# Patient Record
Sex: MALE | Race: WHITE | URBAN - METROPOLITAN AREA
[De-identification: names, ages, dates, MRNs, and addresses within clinical notes are randomized per-mention and may not be internally consistent; named-entity substitution may affect disease eponyms.]

---

## 2021-09-14 NOTE — PROGRESS NOTES
"MEDICAL WEIGHT LOSS INITIAL EVALUATION  DIAGNOSIS:  Class III Obesity    NUTRITION HISTORY:    Do you typically eat breakfast? Yes   If you do eat breakfast, what types of food do you eat? 4 strips claros and 2 eggs, 2 slices toast within 1 hour of waking   Do you typically eat lunch? Yes   If you do eat lunch, what types of food do you typically eat?  2 slices toast with butter, peanut butter, jam or sugar or ham or grilled cheese sandwich   Do you typically eat supper? Yes   If you do eat supper, what types of food do you typically eat? Dominos pizza or 2 brats on buns or claros, eggs, toast   Do you typically eat snacks? Yes-2X per day   If you do snack, what types of food do you typically eat? cheese, crackers, popcorn, 2 slices English muffin bread toasted with peanut butter/ jam or sugar, buttered popcorn       Beverage choices: 6-8 cans Diet Coke or Diet pepsi, rarely drinks water,coffee only when dining out, ETOH- 2 drinks every 2 months  Dining out: Fast food a few X per week-claros cheese burger, french fries, diet Coke  Binge eating: no  Emotional eating: yes, due to feeling bored, or happy/ reward  Night time eating: no  Exercise: no, (knee and back pain)  Other: Spouse does most of the shopping for groceries and most nights  they eat separately (dinner together 2-3X per week). Wife often eats Weight Watchers frozen meal. Adult daughter lives with the family. Patient says his wife arranged this weight loss visit. Retired ~1.5 years ago and has not worked with diabetes education for T2 DM, but has been advised to get a referral from him primary care MD.    ANTHROPOMETRICS:  Height: 5'10\"  Weight: 366.3 lb  BMI:  52.56 kg/m2  NUTRITION DIAGNOSIS:   Obese, class III related to excess energy intake as evidence by BMI of  52.56  NUTRITION INTERVENTIONS  Nutrition Prescription:  Recommend modified energy- nutrient intake  Implementation:  Nutrition Education (Content):    Discussed portion sizes     Determined " alternative to emotional eating    Reviewed My Plate Guidelines (My Plate Planner)    Provided:  My Plate Guidelines, guidelines for selecting protein drinks/ low calorie frozen meals    Nutrition Education (Application):     Patient to practice goals as stated below    Patient verbalizes fair-good understanding of diet    Anticipate fair-good compliance    Goals:  Focus on alternatives to bored/ reward eating (exercise, hobbies, broadcasts, take a class)  Replace snacks with 1 protein drink/day  Try having a low calore frozen entree for dinner a few times per week      FOLLOW UP AND MONITORING:    Other  - follow up with Diabetes Education     TIME SPENT WITH PATIENT:   28 minutes   Mg Fisher RD, LD  [facility]

## 2021-09-15 ENCOUNTER — OFFICE VISIT (OUTPATIENT)
Dept: SURGERY | Facility: CLINIC | Age: 66
End: 2021-09-15
Payer: MEDICARE

## 2021-09-15 ENCOUNTER — ALLIED HEALTH/NURSE VISIT (OUTPATIENT)
Dept: SURGERY | Facility: CLINIC | Age: 66
End: 2021-09-15
Payer: MEDICARE

## 2021-09-15 VITALS
HEART RATE: 83 BPM | BODY MASS INDEX: 45.1 KG/M2 | WEIGHT: 315 LBS | OXYGEN SATURATION: 96 % | SYSTOLIC BLOOD PRESSURE: 132 MMHG | HEIGHT: 70 IN | DIASTOLIC BLOOD PRESSURE: 86 MMHG

## 2021-09-15 DIAGNOSIS — E66.01 MORBID OBESITY WITH BMI OF 50.0-59.9, ADULT (H): ICD-10-CM

## 2021-09-15 DIAGNOSIS — E11.9 TYPE 2 DIABETES MELLITUS WITH INSULIN THERAPY (H): ICD-10-CM

## 2021-09-15 DIAGNOSIS — Z79.4 TYPE 2 DIABETES MELLITUS WITH INSULIN THERAPY (H): ICD-10-CM

## 2021-09-15 DIAGNOSIS — I10 HYPERTENSION, UNSPECIFIED TYPE: ICD-10-CM

## 2021-09-15 DIAGNOSIS — E78.00 HIGH CHOLESTEROL: ICD-10-CM

## 2021-09-15 DIAGNOSIS — G47.33 OSA (OBSTRUCTIVE SLEEP APNEA): ICD-10-CM

## 2021-09-15 DIAGNOSIS — E66.01 MORBID OBESITY WITH BMI OF 50.0-59.9, ADULT (H): Primary | ICD-10-CM

## 2021-09-15 PROCEDURE — 99215 OFFICE O/P EST HI 40 MIN: CPT | Performed by: PHYSICIAN ASSISTANT

## 2021-09-15 PROCEDURE — 97802 MEDICAL NUTRITION INDIV IN: CPT | Performed by: DIETITIAN, REGISTERED

## 2021-09-15 RX ORDER — LEVOTHYROXINE SODIUM 75 UG/1
TABLET ORAL
COMMUNITY
Start: 2020-01-01

## 2021-09-15 RX ORDER — LOSARTAN POTASSIUM 100 MG/1
100 TABLET ORAL
COMMUNITY
Start: 2020-01-01

## 2021-09-15 RX ORDER — INSULIN ASPART 100 [IU]/ML
INJECTION, SOLUTION INTRAVENOUS; SUBCUTANEOUS
COMMUNITY
Start: 2020-01-01

## 2021-09-15 RX ORDER — TORSEMIDE 10 MG/1
10 TABLET ORAL
COMMUNITY
Start: 2020-01-01

## 2021-09-15 RX ORDER — INSULIN DEGLUDEC 200 U/ML
INJECTION, SOLUTION SUBCUTANEOUS
COMMUNITY
Start: 2020-01-01

## 2021-09-15 RX ORDER — HYDROCHLOROTHIAZIDE 25 MG/1
25 TABLET ORAL DAILY
COMMUNITY
Start: 2020-12-19

## 2021-09-15 RX ORDER — FLASH GLUCOSE SENSOR
KIT MISCELLANEOUS
COMMUNITY
Start: 2020-10-27

## 2021-09-15 RX ORDER — FLURBIPROFEN SODIUM 0.3 MG/ML
SOLUTION/ DROPS OPHTHALMIC
COMMUNITY
Start: 2021-07-25

## 2021-09-15 RX ORDER — METOPROLOL TARTRATE 25 MG/1
25 TABLET, FILM COATED ORAL
COMMUNITY
Start: 2021-03-17

## 2021-09-15 RX ORDER — FLASH GLUCOSE SENSOR
1 KIT MISCELLANEOUS
COMMUNITY
Start: 2021-01-05

## 2021-09-15 RX ORDER — SYRINGE-NEEDLE,INSULIN,0.5 ML 28GX1/2"
1 SYRINGE, EMPTY DISPOSABLE MISCELLANEOUS
COMMUNITY
Start: 2021-01-29

## 2021-09-15 RX ORDER — PRAVASTATIN SODIUM 40 MG
TABLET ORAL
COMMUNITY
Start: 2020-01-01

## 2021-09-15 ASSESSMENT — MIFFLIN-ST. JEOR: SCORE: 2452.78

## 2021-09-15 NOTE — PATIENT INSTRUCTIONS
It was great meeting you today!  I look forward to seeing you next month and checking on some of the goals we set!      Goals:  Is to decrease diet soda in half - thus going down to 30 oz daily and substituting the other 30 oz for water.  Diabetic education  Patient will look into getting a recumbent bike           Eat Better ? Move More ? Live Well    Eat 3 nutrient-rich meals each day     Don t skip meals--it will cause you to overeat later in the day!     Eating fiber (vegetables/fruits/whole grains) and protein with meals helps you stay full longer     Choose foods with less than 10 grams of sugar and 5 grams of fat per serving to prevent excess calories and weight re-gain   Eat around the same times each day to develop a routine eating schedule    Avoid snacking unless physically hungry.   Planned snacks: 1-2 times per day and no more than 150 calories    Eat protein first    Protein helps with healing, maintaining adequate muscle mass, reducing hunger and optimizing nutritional status    Aim for 60-80 grams of protein per day   Fill up on Fiber    Fiber comes from plants--fruits, veggies, whole grains, nuts/seeds and beans    Fiber is low in calories, high in phytonutrients and helps you stay full longer    Aim for 25-35 grams per day by eating fiber with meals and snacks  Eat [id number]    Take 20-30 minutes to eat each meal by taking small bites, chewing foods to applesauce consistency or 20-30 times before you swallow    Eating foods too fast can delay satiety/fullness signals and increase overeating   ? Slow down your eating by using toddler utensils, putting your fork/spoon down between bites and not watching TV or emailing during meals!   Keep a Journal          Writing down what you eat, how you feel and when you are active helps you identify new changes to work on from week to week          Look for ways to cut 100 calories from your current diet 2-3 times per day  Drink 64 ounces of 0-Calorie drinks  between meals    Water    Zero calorie Propel  or Vitamin Water      SoBe Lifewater  Zero Calories    Crystal Light , Sugar-Free Aureliano-Aid , and other sugar-free lemonade or flavored allan    Keep Caffeine to less than 300mg per day ie: 3-6oz cups coffee     Work up to 45-60 minutes of physical activity most days of the week    Helps with losing weight and prevent regaining those extra pounds!     Do a combo of cardio (walking/water exercises) and strength training (lifting weights/Vinyasa yoga)    Avoid Mindless Eating    Be present when you eat--take note of the smell, taste and quality of your food    Make a list of alternative activities you could do to prevent eating out of boredom/stress  ? Go for a walk, call a friend, chew gum, paint your nails, re-organize the garage, etc

## 2021-09-15 NOTE — PROGRESS NOTES
"    New Medical Weight Management Consult      PATIENT:  Ugo Smart  MRN:         [id number]  :         1955  ENRRIQUE:         9/15/2021    Dear Romina Simmons MD,     I had the pleasure of seeing your patient, Ugo Smart. Full intake/assessment was done to determine barriers to weight loss success and develop a treatment plan. Ugo Smart is a 65 year old male interested in treatment of medical problems associated with excess weight. He has a height of 5' 10\", a weight of 366 lbs 4.8 oz, and the calculated Body mass index is 52.56 kg/m .      ASSESSMENT/PLAN:  Class 3 severe obesity due to excess calories with Body Mass Index (BMI) of 52  Diabetic Type II insulin dependent  Hypertension   High cholesterol        Has not seen diabetic educator in years     Patient would like to lose a substantial amount of weight over the year.    Discussed the 24 week program.       Goals:  Is to decrease diet soda in half - thus going down to 30 oz daily and substituting the other 30 oz for water.  Diabetic education  Patient will look into getting a recumbent bike       We discussed the role of pharmacological agents in the treatment of obesity and the \"off-label\" use of medications in this practice. We discussed the risks and benefits of each. We discussed indications, contraindications, potential side effects, and estimated costs of each. Discussed that medications must always be used together with lifestyle changes such as improvements in diet choices, portion control and establishing and maintaining a regular exercise program.     Discussed GLP-1. Patient was on ozempic years ago. Did not stay on it for long. Does not remember why. Is currently on insulin and followed by his primary. Will have patient discuss restarting a GLP-1 with primary. Otherwise will hold off on any other weight loss medications.  Patient on metformin as well.  Will see monthly to check on goals and accountability.         All " "of patients questions about the weight loss program were answered fully.        He has the following co-morbidities:       9/10/2021   I have the following health issues associated with obesity: Type II Diabetes, Sleep Apnea   I have the following symptoms associated with obesity: Knee Pain, Back Pain     Last hemoglobin A1C = 8.3 on 2/5/21    Patient Goals 9/10/2021   I am interested in having a healthier weight to diminish current health problems: Yes   I am interested in having a healthier weight in order to prevent future health problems: Yes   I am interested in having a healthier weight in order to have a future surgery: No       Referring Provider 9/10/2021   Please name the provider who referred you to Medical Weight Management.  If you do not know, please answer: \"I Don't Know\". wife       Weight History 9/10/2021   How concerned are you about your weight? Very Concerned   Would you describe your weight gain as gradual? Yes   I became overweight: As an Adult   The following factors have contributed to my weight gain:  Eating Wrong Types of Food, Eating Too Much, Lack of Exercise   I have tried the following methods to lose weight: Weight Watchers   My lowest weight since age 18 was: 175   My highest weight since age 18 was: 395   The most weight I have ever lost was: (lbs) 30   I have the following family history of obesity/being overweight:  My father is overweight   Has anyone in your family had weight loss surgery? No   How has your weight changed over the last year?  Gained   How many pounds? 15 lbs     Up at 6:30 am.  Eats at 7:30 am  Diet Recall Review with Patient 9/10/2021   Do you typically eat breakfast? Yes   If you do eat breakfast, what types of food do you eat? claros and eggs. Today had 2 sides of english muffin with PB and 12 oz diet pepsi   Do you typically eat lunch? Yes   If you do eat lunch, what types of food do you typically eat?  Today had a candy bar as he felt his blood sugars going " down    Do you typically eat supper? Yes   If you do eat supper, what types of food do you typically eat? Last night had dominos pizza.  Had half a large pizza. 12 diet soda   Do you typically eat snacks? Yes   If you do snack, what types of food do you typically eat? cheese, crackers, popcorn,   Do you like vegetables?  No   Do you drink water? No   How many glasses of juice do you drink in a typical day? 0   If you do drink milk, what type? N/A   How many 8oz glasses of sugar containing drinks such as Aureliano-Aid/sweet tea do you drink in a day? 0                       Minimal to no water   How many cans/bottles of sugar pop/soda/tea/sports drinks do you drink in a day? 0   How many cans/bottles of diet pop/soda/tea or sports drink do you drink in a day? 6          Over 60 oz diet soda daily   How often do you have a drink of alcohol? Monthly or Less   If you do drink, how many drinks might you have in a day? 1 or 2       Eating Habits 9/10/2021   Generally, my meals include foods like these: bread, pasta, rice, potatoes, corn, crackers, sweet dessert, pop, or juice. Almost Everyday   Generally, my meals include foods like these: fried meats, brats, burgers, french fries, pizza, cheese, chips, or ice cream. Almost Everyday   Eat fast food (like McDonalds, [employer], Taco Bell). A Few Times a Week   Eat at a buffet or sit-down restaurant. Less Than Weekly   Eat most of my meals in front of the TV or computer. A Few Times a Week   Often skip meals, eat at random times, have no regular eating times. A Few Times a Week   Rarely sit down for a meal but snack or graze throughout.  A Few Times a Week   Eat extra snacks between meals. A Few Times a Week   Eat most of my food at the end of the day. Almost Everyday   Eat in the middle of the night or wake up at night to eat. Never   Eat extra snacks to prevent or correct low blood sugar. A Few Times a Week   Eat to prevent acid reflux or stomach pain. Less Than Weekly    Worry about not having enough food to eat. Never   Have you been to the food shelf at least a few times this year? No   I eat when I am depressed. Less Than Weekly   I eat when I am stressed. Never   I eat when I am bored. A Few Times a Week   I eat when I am anxious. Never   I eat when I am happy or as a reward. A Few Times a Week   I feel hungry all the time even if I just have eaten. Never   Feeling full is important to me. Less Than Weekly   I finish all the food on my plate even if I am already full. Almost Everyday   I can't resist eating delicious food or walk past the good food/smell. A Few Times a Week   I eat/snack without noticing that I am eating. A Few Times a Week   I eat when I am preparing the meal. Never   I eat more than usual when I see others eating. Almost Everyday   I have trouble not eating sweets, ice cream, cookies, or chips if they are around the house. Almost Everyday   I think about food all day. Almost Everyday   What foods, if any, do you crave? Chips/Crackers   Please list any other foods you crave? popcorn       Amount of Food 9/10/2021   I make myself vomit what I have eaten or use laxatives to get rid of food. Never   I eat a large amount of food, like a loaf of bread, a box of cookies, a pint/quart of ice cream, all at once. Never   I eat a large amount of food even when I am not hungry. Never   I eat rapidly. Weekly   I eat alone because I feel embarrassed and do not want others to see how much I have eaten. Never   I eat until I am uncomfortably full. A couple of times per week   I feel bad, disgusted, or guilty after I overeat. Weekly   I make myself vomit what I have eaten or use laxatives to get rid of food. Never       Activity/Exercise History 9/10/2021   How much of a typical 12 hour day do you spend sitting? Most of the Day   How much of a typical 12 hour day do you spend lying down? Less Than Half the Day   How much of a typical day do you spend walking/standing? Less  Than Half the Day   How many hours (not including work) do you spend on the TV/Video Games/Computer/Tablet/Phone? 4-5 Hours   How many times a week are you active for the purpose of exercise? Never   What keeps you from being more active? Pain, Too tired, Worried People Will Look At Me   How many total minutes do you spend doing some activity for the purpose of exercising when you exercise? Less Than 15 Minutes     Does not like exercise particularly. Also tore ligaments in knee. Knees hurt if walk too much      PAST MEDICAL HISTORY:  Past Medical History:   Diagnosis Date     Diabetes (H)      Hypertension      GERONIMO (obstructive sleep apnea)        Work/Social History Reviewed With Patient 9/10/2021   My employment status is: Retired   What is your marital status? /In a Relationship   If in a relationship, is your significant other overweight? No   Do you have children? Yes   If you have children, are they overweight? No   Who do you live with?  wife and child   Are they supportive of your health goals? Yes   Who does the food shopping?  Myself and wife       Mental Health History Reviewed With Patient 9/10/2021   Have you ever been physically or sexually abused? No   If yes, do you feel that the abuse is affecting your weight? N/A   If yes, would you like to talk to a counselor about the abuse? N/A   How often in the past 2 weeks have you felt little interest or pleasure in doing things? Not at all   Over the past 2 weeks how often have you felt down, depressed, or hopeless? Not at all       Sleep History Reviewed With Patient 9/10/2021   How many hours do you sleep at night? 6   Do you think that you snore loudly or has anybody ever heard you snore loudly (louder than talking or so loud it can be heard behind a shut door)? Yes   Has anyone seen or heard you stop breathing during your sleep? Yes   Do you often feel tired, fatigued, or sleepy during the day? Yes   Do you have a TV/Computer in your bedroom? No      ROS  General  Fatigue: No  Sleep Quality: OK  Birth Control: NA   HEENT  Hx of glaucoma: No. Had cataract surgery. Gets yearly eye exam  Vision changes: No  Cardiovascular  Chest Pain with Exertion: No  Palpitations: No  Hx of heart disease: No  Hx of PVD: No  Pulmonary  Shortness of breath at rest: No  Shortness of breath with exertion: Yes  Snoring: Uses CPAP nightly  Gastrointestinal  Heartburn: No  Abdominal pain: No  History of pancreatitis: No  Severe constipation: No  Hx of bowel obstruction: No  Gastrourinary  History of kidney stones: No  Psychiatric  Moods Stable: Yes  History of drug abuse: No  Endocrine  Polydipsia: No  Polyuria: No  Personal or family history of thyroid cancer: No  Neurologic:  Hx of seizures: No  Hx of paresthesias: No      MEDICATIONS:   Current Outpatient Medications   Medication Sig Dispense Refill     Continuous Blood Gluc Sensor (FREESTYLE PERRY 14 DAY SENSOR) MISC 1 each       Continuous Blood Gluc Sensor (FREESTYLE PERRY 14 DAY SENSOR) MISC USE 1 EACH TO TEST EVERY 14 DAYS.       hydrochlorothiazide (HYDRODIURIL) 25 MG tablet Take 25 mg by mouth daily       insulin aspart (NOVOLOG FLEXPEN) 100 UNIT/ML pen INJECT 27-45 UNITS SUBCUTANEOUSLY THREE TIMES DAILY BEFORE MEALS. 27-36-45 BREAKFAST, LUNCH AND DINNER       insulin degludec (TRESIBA FLEXTOUCH) 200 UNIT/ML pen Inject 80 units under the skin every morning and 70 units at bedtime.       insulin pen needle (B-D U/F) 31G X 5 MM miscellaneous INJECT INSULIN FOUR TIMES A DAY       insulin pen needle (EXEL COMFORT POINT PEN NEEDLE) 29G X 12MM miscellaneous 1 each       levothyroxine (SYNTHROID/LEVOTHROID) 75 MCG tablet TAKE ONE TABLET BY MOUTH EVERY DAY       losartan (COZAAR) 100 MG tablet Take 100 mg by mouth       metFORMIN (GLUCOPHAGE) 500 MG tablet        metoprolol tartrate (LOPRESSOR) 25 MG tablet Take 25 mg by mouth       pravastatin (PRAVACHOL) 40 MG tablet TAKE ONE TABLET BY MOUTH EVERY DAY       torsemide (DEMADEX)  "10 MG tablet Take 10 mg by mouth         ALLERGIES:   No Known Allergies    PHYSICAL EXAM:  /86   Pulse 83   Ht 5' 10\" (1.778 m)   Wt (!) 366 lb 4.8 oz (166.2 kg)   SpO2 96%   BMI 52.56 kg/m    GENERAL: Healthy, alert and no distress  EYES: Eyes grossly normal to inspection.  No discharge or erythema, or obvious scleral/conjunctival abnormalities.  RESP: No audible wheeze, cough, or visible cyanosis.  No visible retractions or increased work of breathing.    SKIN: Visible skin clear. No significant rash, abnormal pigmentation or lesions.  NEURO: Cranial nerves grossly intact.  Mentation and speech appropriate for age.  PSYCH: Mentation appears normal, affect normal/bright, judgement and insight intact, normal speech and appearance well-groomed.        FOLLOW-UP:   Diet today  1 month provider      Sincerely,    Kiesha Hernandez PA-C    46 minutes spent on the date of the encounter doing chart review, review of test results, patient visit and documentation     "

## 2021-10-19 ENCOUNTER — OFFICE VISIT (OUTPATIENT)
Dept: SURGERY | Facility: CLINIC | Age: 66
End: 2021-10-19
Payer: MEDICARE

## 2021-10-19 VITALS
SYSTOLIC BLOOD PRESSURE: 132 MMHG | HEART RATE: 74 BPM | BODY MASS INDEX: 45.1 KG/M2 | DIASTOLIC BLOOD PRESSURE: 80 MMHG | WEIGHT: 315 LBS | HEIGHT: 70 IN

## 2021-10-19 DIAGNOSIS — E66.01 MORBID OBESITY WITH BMI OF 50.0-59.9, ADULT (H): Primary | ICD-10-CM

## 2021-10-19 DIAGNOSIS — G47.33 OSA (OBSTRUCTIVE SLEEP APNEA): ICD-10-CM

## 2021-10-19 DIAGNOSIS — Z79.4 TYPE 2 DIABETES MELLITUS WITH INSULIN THERAPY (H): ICD-10-CM

## 2021-10-19 DIAGNOSIS — E11.9 TYPE 2 DIABETES MELLITUS WITH INSULIN THERAPY (H): ICD-10-CM

## 2021-10-19 DIAGNOSIS — I10 HYPERTENSION, UNSPECIFIED TYPE: ICD-10-CM

## 2021-10-19 PROCEDURE — 99213 OFFICE O/P EST LOW 20 MIN: CPT | Performed by: PHYSICIAN ASSISTANT

## 2021-10-19 ASSESSMENT — MIFFLIN-ST. JEOR: SCORE: 2455.5

## 2021-10-19 NOTE — PROGRESS NOTES
2021      Return Medical Weight Management Note     Ugo Smart  MRN:  [id number]  :  1955      Dear Romina Finley,    I had the pleasure of doing a visit with your patient Ugo Smart.  He is a 65 year old male who I am continuing to see for treatment of obesity related to:       9/10/2021   I have the following health issues associated with obesity: Type II Diabetes, Sleep Apnea   I have the following symptoms associated with obesity: Knee Pain, Back Pain       INTERVAL HISTORY:  Met patient last month. He has since talked to primary and added ozempic a couple weeks ago. Is at the 0.25 mg dose for another 2 weeks before increasing to 0.5 mg dose. His appetite has gone down and thinks it is a combination of the medication and increasing his protein. Has been working on lifestyle. Scheduled to see diabetic educator in a couple of weeks.  Feels like he just started with these changes in the past couple of weeks so has not lost any weight yet.       Previous Goals:  Is to decrease diet soda in half - thus going down to 30 oz daily and substituting the other 30 oz for water.  MET - started getting in over 90 oz water. Starts first thing in the AM.  Diabetic education - Has it scheduled  Patient will look into getting a recumbent bike - Did not get bike but has been walking 5 days weekly for 10-15 minutes        CURRENT WEIGHT:   366 lbs 14.4 oz    Wt Readings from Last 4 Encounters:   10/19/21 (!) 366 lb 14.4 oz (166.4 kg)   09/15/21 (!) 366 lb 4.8 oz (166.2 kg)       BARIATRIC METRICS:  Current Weight: (!) 366 lb 14.4 oz (166.4 kg)  Body mass index is 52.64 kg/m .   Wt change since last visit (lbs): 0.5  Cumulative weight loss (lbs): -0.5      DIETARY HISTORY  Meals Per Day: 3  Food Diary:   B: Piece of toast with butter and honey  L: grilled cheese sandwich and tomato soup  D: egg with downing's soup because of his cracked tooth. Also piece of PB toast  Snacks Per Day:    Typical Snack: toast sometimes. Has added a protein drink 1-2 times daily and this has helped with appetite.    Fluid Intake:  Over 90 oz water and about 4 cans of diet soda  Typical Protein Food Choices: dairy   Meals at Restaurant per week: 3    Exercise:       ROS: 10/19/21  General  Fatigue: No  Sleep Quality: OK  Birth Control: NA   HEENT  Hx of glaucoma: No. Had cataract surgery. Gets yearly eye exam  Vision changes: No  Cardiovascular  Chest Pain with Exertion: No  Palpitations: No  Hx of heart disease: No  Hx of PVD: No  Pulmonary  Shortness of breath at rest: No  Shortness of breath with exertion: Yes  Snoring: Uses CPAP nightly  Gastrointestinal  Heartburn: No  Abdominal pain: No  History of pancreatitis: No  Severe constipation: Has some mild constipation since starting ozempic  Hx of bowel obstruction: No  No nausea   Gastrourinary  History of kidney stones: No  Psychiatric  Moods Stable: Yes  History of drug abuse: No  Endocrine  Polydipsia: No  Polyuria: No  Personal or family history of thyroid cancer: No  Neurologic:  Hx of seizures: No  Hx of paresthesias: No        MEDICATIONS:   Current Outpatient Medications   Medication     Continuous Blood Gluc Sensor (FREESTYLE PERRY 14 DAY SENSOR) MISC     Continuous Blood Gluc Sensor (FREESTYLE PERRY 14 DAY SENSOR) MISC     hydrochlorothiazide (HYDRODIURIL) 25 MG tablet     insulin aspart (NOVOLOG FLEXPEN) 100 UNIT/ML pen     insulin degludec (TRESIBA FLEXTOUCH) 200 UNIT/ML pen     insulin pen needle (B-D U/F) 31G X 5 MM miscellaneous     insulin pen needle (EXEL COMFORT POINT PEN NEEDLE) 29G X 12MM miscellaneous     levothyroxine (SYNTHROID/LEVOTHROID) 75 MCG tablet     losartan (COZAAR) 100 MG tablet     metFORMIN (GLUCOPHAGE) 500 MG tablet     metoprolol tartrate (LOPRESSOR) 25 MG tablet     pravastatin (PRAVACHOL) 40 MG tablet     torsemide (DEMADEX) 10 MG tablet     No current facility-administered medications for this visit.       PE:  /80    "Pulse 74   Ht 5' 10\" (1.778 m)   Wt (!) 366 lb 14.4 oz (166.4 kg)   BMI 52.64 kg/m    GENERAL: Healthy, alert and no distress  EYES: Eyes grossly normal to inspection.  No discharge or erythema, or obvious scleral/conjunctival abnormalities.  RESP: No audible wheeze, cough, or visible cyanosis.  No visible retractions or increased work of breathing.    SKIN: Visible skin clear. No significant rash, abnormal pigmentation or lesions.  NEURO: Cranial nerves grossly intact.  Mentation and speech appropriate for age.  PSYCH: Mentation appears normal, affect normal/bright, judgement and insight intact, normal speech and appearance well-groomed.        ASSESSMENT/PLAN:   Class 3 severe obesity due to excess calories with Body Mass Index (BMI) of 52  Diabetic Type II insulin dependent  Hypertension   High cholesterol        Congratulated patient on his changes. Encouraged him to continue with planned diabetic educator visit. Discussed looking into silver sneakers.  Will have patient continue with previous goals and will check back in December.          Kiesha Hernandez MS, PA-C    25 minutes spent on the date of the encounter doing chart review, review of test results, patient visit and documentation     "

## 2021-10-30 ENCOUNTER — HEALTH MAINTENANCE LETTER (OUTPATIENT)
Age: 66
End: 2021-10-30

## 2022-02-19 ENCOUNTER — HEALTH MAINTENANCE LETTER (OUTPATIENT)
Age: 67
End: 2022-02-19

## 2022-06-11 ENCOUNTER — HEALTH MAINTENANCE LETTER (OUTPATIENT)
Age: 67
End: 2022-06-11

## 2022-10-22 ENCOUNTER — HEALTH MAINTENANCE LETTER (OUTPATIENT)
Age: 67
End: 2022-10-22

## 2023-04-01 ENCOUNTER — HEALTH MAINTENANCE LETTER (OUTPATIENT)
Age: 68
End: 2023-04-01

## 2023-08-27 ENCOUNTER — HEALTH MAINTENANCE LETTER (OUTPATIENT)
Age: 68
End: 2023-08-27

## 2023-11-05 ENCOUNTER — HEALTH MAINTENANCE LETTER (OUTPATIENT)
Age: 68
End: 2023-11-05

## 2024-01-14 ENCOUNTER — HEALTH MAINTENANCE LETTER (OUTPATIENT)
Age: 69
End: 2024-01-14

## 2024-06-02 ENCOUNTER — HEALTH MAINTENANCE LETTER (OUTPATIENT)
Age: 69
End: 2024-06-02

## 2024-10-20 ENCOUNTER — HEALTH MAINTENANCE LETTER (OUTPATIENT)
Age: 69
End: 2024-10-20